# Patient Record
Sex: MALE | Race: WHITE | Employment: PART TIME | URBAN - METROPOLITAN AREA
[De-identification: names, ages, dates, MRNs, and addresses within clinical notes are randomized per-mention and may not be internally consistent; named-entity substitution may affect disease eponyms.]

---

## 2020-09-01 ENCOUNTER — HOSPITAL ENCOUNTER (EMERGENCY)
Age: 30
Discharge: HOME | End: 2020-09-01
Attending: EMERGENCY MEDICINE

## 2020-09-01 ENCOUNTER — APPOINTMENT (OUTPATIENT)
Dept: GENERAL RADIOLOGY | Age: 30
End: 2020-09-01

## 2020-09-01 VITALS
HEIGHT: 70 IN | RESPIRATION RATE: 17 BRPM | DIASTOLIC BLOOD PRESSURE: 63 MMHG | WEIGHT: 227 LBS | SYSTOLIC BLOOD PRESSURE: 98 MMHG | HEART RATE: 98 BPM | TEMPERATURE: 98.4 F | BODY MASS INDEX: 32.5 KG/M2 | OXYGEN SATURATION: 92 %

## 2020-09-01 PROCEDURE — 99284 EMERGENCY DEPT VISIT MOD MDM: CPT

## 2020-09-01 PROCEDURE — 73130 X-RAY EXAM OF HAND: CPT

## 2020-09-01 RX ORDER — CLINDAMYCIN HYDROCHLORIDE 150 MG/1
150 CAPSULE ORAL ONCE
Status: DISCONTINUED | OUTPATIENT
Start: 2020-09-01 | End: 2020-09-01

## 2020-09-01 RX ORDER — DOXYCYCLINE 100 MG/1
100 CAPSULE ORAL ONCE
Status: DISCONTINUED | OUTPATIENT
Start: 2020-09-01 | End: 2020-09-01

## 2020-09-01 ASSESSMENT — ENCOUNTER SYMPTOMS
VOMITING: 0
ABDOMINAL PAIN: 0
BACK PAIN: 0
SHORTNESS OF BREATH: 0
COUGH: 0
DIARRHEA: 0

## 2020-09-01 NOTE — ED NOTES
Report given to Marzena Paul RN from ER. Report method in person   The following was reviewed with receiving RN:   Current vital signs:  BP 98/63   Pulse 98   Temp 98.4 °F (36.9 °C) (Oral)   Resp 17   Ht 5' 10\" (1.778 m)   Wt 227 lb (103 kg)   SpO2 92%   BMI 32.57 kg/m²                MEWS Score: 2     Any medication or safety alerts were reviewed. Any pending diagnostics and notifications were also reviewed, as well as any safety concerns or issues, abnormal labs, abnormal imaging, and abnormal assessment findings. Questions were answered.             Keara Flores RN  09/01/20 9848

## 2020-09-01 NOTE — ED NOTES
Mode of arrival: EMS [facility]        Chief complaints: Overdose      Scenario: Per EMS pt was found unresponsive and given 4mg of Narcan. Pt states he thought he was snorting cocaine but was unsure of what he took. Pt arrives and is alert and oriented x4. Pt did have a knife that was given to writer by EMS. Writer gave knife to security. C= \"Have you ever felt that you should Cut down on your drinking? \"  No  A= \"Have people Annoyed you by criticizing your drinking? \"  No  G= \"Have you ever felt bad or Guilty about your drinking? \"  No  E= \"Have you ever had a drink as an Eye-opener first thing in the morning to steady your nerves or to help a hangover? \"  No      Deferred []      Reason for deferring: N/A    *If yes to two or more: probable alcohol abuse. Carolynn Sanford RN  09/01/20 8822
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
## 2020-09-01 NOTE — ED NOTES
Puncture wound between 4th and 5th right fingers cleansed with normal saline and antibiotic ointment and bandaid applied.      Wendy Ruff RN  09/01/20 7317

## 2021-02-03 ENCOUNTER — FOLLOWUP TELEPHONE ENCOUNTER (OUTPATIENT)
Dept: PSYCHIATRY | Age: 31
End: 2021-02-03
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
